# Patient Record
Sex: MALE | Race: WHITE | NOT HISPANIC OR LATINO
[De-identification: names, ages, dates, MRNs, and addresses within clinical notes are randomized per-mention and may not be internally consistent; named-entity substitution may affect disease eponyms.]

---

## 2021-07-20 ENCOUNTER — APPOINTMENT (OUTPATIENT)
Dept: INTERNAL MEDICINE | Facility: CLINIC | Age: 66
End: 2021-07-20

## 2021-12-17 ENCOUNTER — EMERGENCY (EMERGENCY)
Facility: HOSPITAL | Age: 66
LOS: 0 days | Discharge: HOME | End: 2021-12-17
Attending: STUDENT IN AN ORGANIZED HEALTH CARE EDUCATION/TRAINING PROGRAM
Payer: MEDICARE

## 2021-12-17 VITALS — HEIGHT: 72 IN | WEIGHT: 149.91 LBS

## 2021-12-17 VITALS
HEART RATE: 90 BPM | SYSTOLIC BLOOD PRESSURE: 196 MMHG | RESPIRATION RATE: 18 BRPM | TEMPERATURE: 99 F | OXYGEN SATURATION: 98 % | DIASTOLIC BLOOD PRESSURE: 96 MMHG

## 2021-12-17 DIAGNOSIS — S81.811A LACERATION WITHOUT FOREIGN BODY, RIGHT LOWER LEG, INITIAL ENCOUNTER: ICD-10-CM

## 2021-12-17 DIAGNOSIS — Z23 ENCOUNTER FOR IMMUNIZATION: ICD-10-CM
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
## 2021-12-17 DIAGNOSIS — S81.011A LACERATION WITHOUT FOREIGN BODY, RIGHT KNEE, INITIAL ENCOUNTER: ICD-10-CM
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
## 2021-12-17 PROCEDURE — 73562 X-RAY EXAM OF KNEE 3: CPT | Mod: RT

## 2021-12-17 PROCEDURE — 73700 CT LOWER EXTREMITY W/O DYE: CPT | Mod: MG,RT

## 2021-12-17 PROCEDURE — 12002 RPR S/N/AX/GEN/TRNK2.6-7.5CM: CPT

## 2021-12-17 PROCEDURE — 99284 EMERGENCY DEPT VISIT MOD MDM: CPT

## 2021-12-17 PROCEDURE — 99284 EMERGENCY DEPT VISIT MOD MDM: CPT | Mod: 25

## 2021-12-17 PROCEDURE — G1004: CPT

## 2021-12-17 PROCEDURE — 73700 CT LOWER EXTREMITY W/O DYE: CPT | Mod: 26,RT,MG

## 2021-12-17 PROCEDURE — 73562 X-RAY EXAM OF KNEE 3: CPT | Mod: 26,RT

## 2021-12-17 PROCEDURE — 76376 3D RENDER W/INTRP POSTPROCES: CPT

## 2021-12-17 PROCEDURE — 76376 3D RENDER W/INTRP POSTPROCES: CPT | Mod: 26

## 2021-12-17 PROCEDURE — 90471 IMMUNIZATION ADMIN: CPT

## 2021-12-17 PROCEDURE — 90715 TDAP VACCINE 7 YRS/> IM: CPT

## 2021-12-17 RX ORDER — CEFTRIAXONE 500 MG/1
1000 INJECTION, POWDER, FOR SOLUTION INTRAMUSCULAR; INTRAVENOUS ONCE
Refills: 0 | Status: DISCONTINUED | OUTPATIENT
Start: 2021-12-17 | End: 2021-12-17

## 2021-12-17 RX ORDER — CEPHALEXIN 500 MG
1 CAPSULE ORAL
Qty: 21 | Refills: 0
Start: 2021-12-17 | End: 2021-12-23

## 2021-12-17 RX ORDER — CEFAZOLIN SODIUM 1 G
1000 VIAL (EA) INJECTION ONCE
Refills: 0 | Status: COMPLETED | OUTPATIENT
Start: 2021-12-17 | End: 2021-12-17

## 2021-12-17 RX ORDER — METRONIDAZOLE 500 MG
1 TABLET ORAL
Qty: 21 | Refills: 0
Start: 2021-12-17 | End: 2021-12-23

## 2021-12-17 RX ORDER — METRONIDAZOLE 500 MG
500 TABLET ORAL ONCE
Refills: 0 | Status: COMPLETED | OUTPATIENT
Start: 2021-12-17 | End: 2021-12-17

## 2021-12-17 RX ORDER — CEFTRIAXONE 500 MG/1
500 INJECTION, POWDER, FOR SOLUTION INTRAMUSCULAR; INTRAVENOUS ONCE
Refills: 0 | Status: DISCONTINUED | OUTPATIENT
Start: 2021-12-17 | End: 2021-12-17

## 2021-12-17 RX ORDER — TETANUS TOXOID, REDUCED DIPHTHERIA TOXOID AND ACELLULAR PERTUSSIS VACCINE, ADSORBED 5; 2.5; 8; 8; 2.5 [IU]/.5ML; [IU]/.5ML; UG/.5ML; UG/.5ML; UG/.5ML
0.5 SUSPENSION INTRAMUSCULAR ONCE
Refills: 0 | Status: COMPLETED | OUTPATIENT
Start: 2021-12-17 | End: 2021-12-17

## 2021-12-17 RX ORDER — CEFTRIAXONE 500 MG/1
1000 INJECTION, POWDER, FOR SOLUTION INTRAMUSCULAR; INTRAVENOUS ONCE
Refills: 0 | Status: COMPLETED | OUTPATIENT
Start: 2021-12-17 | End: 2021-12-17

## 2021-12-17 RX ORDER — CEFAZOLIN SODIUM 1 G
2000 VIAL (EA) INJECTION EVERY 8 HOURS
Refills: 0 | Status: DISCONTINUED | OUTPATIENT
Start: 2021-12-17 | End: 2021-12-17

## 2021-12-17 RX ORDER — CEFAZOLIN SODIUM 1 G
1000 VIAL (EA) INJECTION ONCE
Refills: 0 | Status: DISCONTINUED | OUTPATIENT
Start: 2021-12-17 | End: 2021-12-17

## 2021-12-17 RX ADMIN — TETANUS TOXOID, REDUCED DIPHTHERIA TOXOID AND ACELLULAR PERTUSSIS VACCINE, ADSORBED 0.5 MILLILITER(S): 5; 2.5; 8; 8; 2.5 SUSPENSION INTRAMUSCULAR at 15:33

## 2021-12-17 RX ADMIN — Medication 500 MILLIGRAM(S): at 20:46

## 2021-12-17 NOTE — CONSULT NOTE ADULT - SUBJECTIVE AND OBJECTIVE BOX
66M presents to the  ED w/ a  laceration over the left knee. Patient states he was leaning over trimming ornamental with a chainsaw when he accidentally cut his Right knee with the blade. Bleeding controlled prior to consultation and patient states that he is experiencing minimal pain at or surrounding the site of the laceration. Denies radiation of pain elsewhere. States ability to ambulate immediately following the injury. Denies any weakness, numbness, or tingling int he Right lower extremity. Denies having any other pain elsewhere. Endorses a previous orthopaedic history of a discectomy at [facility] over 30 years ago; denies any additional previous orthopaedic history aside from being diagnosed with Osgood-Schlatter's disease at age 11. No other orthopaedic concerns at this time.    PAST MEDICAL & SURGICAL HISTORY:      No Known Allergies      PHYSICAL EXAM:  T(C): 37.4 (12-17-21 @ 14:34), Max: 37.4 (12-17-21 @ 14:34)  HR: 90 (12-17-21 @ 14:34) (90 - 90)  BP: 196/96 (12-17-21 @ 14:34) (196/96 - 196/96)  RR: 18 (12-17-21 @ 14:34) (18 - 18)  SpO2: 98% (12-17-21 @ 14:34) (98% - 98%)    Gen: NAD, Resting comfortably    RLE:  5 cm horizontally-oriented laceration overlying the supero-lateral patella; No active bleeding and wound appears grossly non-contaminated with no purulence or erythema of the surrounding skin.   No bony tenderness to palpation over the patella.  +Q/H/TA/EHL/FHL/GSC.   +SILT L3-S1.   + DP/PT.   Compartments soft and compressible.   No calf tenderness.     Secondary Survey:   LLE/RUE/LUE: No TTP over bony prominences, SILT, palpable pulses, full/painless range of motion, compartments soft.   Spine: No bony tenderness. No palpable step-offs.      Imaging:  XR R Knee: No acute fracture identified; Chronic osseous changes overlying the anterior proximal tibia, likely secondary to Osgood-Schlatter's.      A/P:  66M w/ right knee laceration.   - Intravenous antibiotics - Cefazolin 2000 mg q8h.   - Pain control as needed.   - WBAT RLE.   - Follow up CT RLE to rule out traumatic arthrotomy.   - Plan discussed with Dr. Pimentel; Will advise of any changes to the above plan.        66M presents to the  ED w/ a  laceration over the left knee. Patient states he was leaning over trimming ornamental with a chainsaw when he accidentally cut his Right knee with the blade. Bleeding controlled prior to consultation and patient states that he is experiencing minimal pain at or surrounding the site of the laceration. Denies radiation of pain elsewhere. States ability to ambulate immediately following the injury. Denies any weakness, numbness, or tingling int he Right lower extremity. Denies having any other pain elsewhere. Endorses a previous orthopaedic history of a discectomy at [facility] over 30 years ago; denies any additional previous orthopaedic history aside from being diagnosed with Osgood-Schlatter's disease at age 11. No other orthopaedic concerns at this time.    PAST MEDICAL & SURGICAL HISTORY:      No Known Allergies      PHYSICAL EXAM:  T(C): 37.4 (12-17-21 @ 14:34), Max: 37.4 (12-17-21 @ 14:34)  HR: 90 (12-17-21 @ 14:34) (90 - 90)  BP: 196/96 (12-17-21 @ 14:34) (196/96 - 196/96)  RR: 18 (12-17-21 @ 14:34) (18 - 18)  SpO2: 98% (12-17-21 @ 14:34) (98% - 98%)    Gen: NAD, Resting comfortably    RLE:  5 cm horizontally-oriented laceration overlying the supero-lateral patella; No active bleeding and wound appears grossly non-contaminated with no purulence or erythema of the surrounding skin.   No bony tenderness to palpation over the patella.  +Q/H/TA/EHL/FHL/GSC.   +SILT L3-S1.   + DP/PT.   Compartments soft and compressible.   No calf tenderness.     Secondary Survey:   LLE/RUE/LUE: No TTP over bony prominences, SILT, palpable pulses, full/painless range of motion, compartments soft.   Spine: No bony tenderness. No palpable step-offs.      Imaging:    XR R Knee: No acute fracture identified; Chronic osseous changes overlying the anterior proximal tibia, likely secondary to Osgood-Schlatter's.      CT R Femur: Soft tissue laceration anteriorly with subcutaneous air and tiny foci of   increased density anterior to the lateral patella, favored to represent small avulsed fracture fragments vs. foreign bodies and/or soft tissue mineralization. No air in the joint suggestive of traumatic arthrotomy     A/P:  66M w/ right knee laceration.   - Intravenous antibiotics.   - Pain control as needed.   - WBAT RLE.   - CT RLE without evidence of traumatic arthrotomy.   - No acute orthopaedic intervention indicated at this time.   - Plan discussed with Dr. Pimentel; Will advise of any changes to the above plan.        66M presents to the  ED w/ a  laceration over the left knee. Patient states he was leaning over trimming ornamental with a chainsaw when he accidentally cut his Right knee with the blade. Bleeding controlled prior to consultation and patient states that he is experiencing minimal pain at or surrounding the site of the laceration. Denies radiation of pain elsewhere. States ability to ambulate immediately following the injury. Denies any weakness, numbness, or tingling int he Right lower extremity. Denies having any other pain elsewhere. Endorses a previous orthopaedic history of a discectomy at [facility] over 30 years ago; denies any additional previous orthopaedic history aside from being diagnosed with Osgood-Schlatter's disease at age 11. No other orthopaedic concerns at this time.    PAST MEDICAL & SURGICAL HISTORY:      No Known Allergies      PHYSICAL EXAM:  T(C): 37.4 (12-17-21 @ 14:34), Max: 37.4 (12-17-21 @ 14:34)  HR: 90 (12-17-21 @ 14:34) (90 - 90)  BP: 196/96 (12-17-21 @ 14:34) (196/96 - 196/96)  RR: 18 (12-17-21 @ 14:34) (18 - 18)  SpO2: 98% (12-17-21 @ 14:34) (98% - 98%)    Gen: NAD, Resting comfortably    RLE:  5 cm horizontally-oriented laceration overlying the supero-lateral patella; No active bleeding and wound appears grossly non-contaminated with no purulence or erythema of the surrounding skin.   No bony tenderness to palpation over the patella.  +Q/H/TA/EHL/FHL/GSC.   +SILT L3-S1.   + DP/PT.   Compartments soft and compressible.   No calf tenderness.     Secondary Survey:   LLE/RUE/LUE: No TTP over bony prominences, SILT, palpable pulses, full/painless range of motion, compartments soft.   Spine: No bony tenderness. No palpable step-offs.      Imaging:    XR R Knee: No acute fracture identified; Chronic osseous changes overlying the anterior proximal tibia, likely secondary to Osgood-Schlatter's.      CT R Femur: Soft tissue laceration anteriorly with subcutaneous air and tiny foci of   increased density anterior to the lateral patella, favored to represent small avulsed fracture fragments vs. foreign bodies and/or soft tissue mineralization. No air in the joint suggestive of traumatic arthrotomy     A/P:  66M w/ right knee laceration.   - Patient refusing intravenous antibiotics; Recommend PO Abx x10 days.    - Pain control as needed.   - WBAT RLE.   - CT RLE without evidence of traumatic arthrotomy.   - No acute orthopaedic intervention indicated at this time.   - Follow up with Dr. Pimentel as an outpatient. Call office for appointment.   - Plan discussed with Dr. Pimentel; Will advise of any changes to the above plan.        66M presents to the  ED w/ a  laceration over the left knee. Patient states he was leaning over trimming ornamental with a chainsaw when he accidentally cut his Right knee with the blade. Bleeding controlled prior to consultation and patient states that he is experiencing minimal pain at or surrounding the site of the laceration. Denies radiation of pain elsewhere. States ability to ambulate immediately following the injury. Denies any weakness, numbness, or tingling int he Right lower extremity. Denies having any other pain elsewhere. Endorses a previous orthopaedic history of a discectomy at [facility] over 30 years ago; denies any additional previous orthopaedic history aside from being diagnosed with Osgood-Schlatter's disease at age 11. No other orthopaedic concerns at this time.    PAST MEDICAL & SURGICAL HISTORY:      No Known Allergies      PHYSICAL EXAM:  T(C): 37.4 (12-17-21 @ 14:34), Max: 37.4 (12-17-21 @ 14:34)  HR: 90 (12-17-21 @ 14:34) (90 - 90)  BP: 196/96 (12-17-21 @ 14:34) (196/96 - 196/96)  RR: 18 (12-17-21 @ 14:34) (18 - 18)  SpO2: 98% (12-17-21 @ 14:34) (98% - 98%)    Gen: NAD, Resting comfortably    RLE:  5 cm horizontally-oriented laceration overlying the supero-lateral patella; No active bleeding and wound appears grossly non-contaminated with no purulence or erythema of the surrounding skin.   No bony tenderness to palpation over the patella.  +Q/H/TA/EHL/FHL/GSC.   +SILT L3-S1.   + DP/PT.   Compartments soft and compressible.   No calf tenderness.     Secondary Survey:   LLE/RUE/LUE: No TTP over bony prominences, SILT, palpable pulses, full/painless range of motion, compartments soft.   Spine: No bony tenderness. No palpable step-offs.      Imaging:    XR R Knee: No acute fracture identified; Chronic osseous changes overlying the anterior proximal tibia, likely secondary to Osgood-Schlatter's.      CT R Femur: Soft tissue laceration anteriorly with subcutaneous air and tiny foci of   increased density anterior to the lateral patella, favored to represent small avulsed fracture fragments vs. foreign bodies and/or soft tissue mineralization. No air in the joint suggestive of traumatic arthrotomy     A/P:  66M w/ right knee laceration.   - Laceration probed, copiously irrigated, and closed at bedside under sterile conditions; Dressed with Xeroform, 4x4, and ACE bandage.   - Knee immobilizer to facilitate maintained wound approximation/healing.   - Patient refusing intravenous antibiotics; Recommend PO Abx x10 days.    - Pain control as needed.   - WBAT RLE.   - CT RLE without evidence of traumatic arthrotomy; small avulsed patellar fracture fragments vs. foreign bodies and/or soft tissue mineralization  - No acute orthopaedic intervention indicated at this time.   - Follow up with Dr. Pimentel as an outpatient. Call office for appointment.   - Plan discussed with Dr. Pimentel; Will advise of any changes to the above plan.

## 2021-12-17 NOTE — ED STATDOCS - NS ED ROS FT
Constitutional: No fever.  Eyes: No vision changes.   Ears, Nose, Mouth, Throat: No congestion.  Cardiovascular: No chest pain.  Respiratory: No difficulty breathing.  Gastrointestinal: No nausea . No vomiting.  Genitourinary: No dysuria.  Musculoskeletal: No joint pain.  Neurological: No headache.  Integumentary (skin and/or breast): No rash.  (+) laceration

## 2021-12-17 NOTE — ED STATDOCS - PROGRESS NOTE DETAILS
pt refusing IV in ED will give Rocephin IM and Doxycycline. pt seen by ortho team and cleared by them even though ct shows open laceration to right knee. Ortho Resident Mega states this is not an ortho concern and pt does not have to follow up with their service. -Amy Medrano PA-C pt refusing IV in ED will give Rocephin IM and Doxycycline. pt seen by ortho team and cleared by them even though ct shows open fx laceration to right knee. -Amy Medrano PA-C pt wound exploration shows tendon laceration, ortho team at bedside and states they will repair laceration, recommended iv antibiotics but pt still reluctant to have an IV placed, will dc home to fu with ortho outpt setting and take oral antibiotics. pt aware to return to ED for any worsening of symptoms and increase pain, reddens, pain or any other worsening of symptoms. pt agrees with plan. -Amy Medrano PA-C

## 2021-12-17 NOTE — ED STATDOCS - OBJECTIVE STATEMENT
67 y/o M with no significant PMHx presents to the ED c/o laceration to his R LE in the setting of trimming hedges with a chainsaw today. No other injuries. Tetanus status unknown. 67 y/o M with no significant PMHx presents to the ED c/o laceration to his R LE in the setting of trimming hedges with a chainsaw today. No other injuries. Tetanus status unknown. No pain meds taken prior to arrival. No associated lower extremity numbness.

## 2021-12-17 NOTE — ED STATDOCS - NSFOLLOWUPINSTRUCTIONS_ED_ALL_ED_FT
Laceration    A laceration is a cut that goes through all of the layers of the skin and into the tissue that is right under the skin. Some lacerations heal on their own. Others need to be closed with skin adhesive strips, skin glue, stitches (sutures), or staples. Proper laceration care minimizes the risk of infection and helps the laceration to heal better.  If non-absorbable stitches or staples have been placed, they must be taken out within the time frame instructed by your healthcare provider.    SEEK IMMEDIATE MEDICAL CARE IF YOU HAVE ANY OF THE FOLLOWING SYMPTOMS: swelling around the wound, worsening pain, drainage from the wound, red streaking going away from your wound, inability to move finger or toe near the laceration, or discoloration of skin near the laceration.    Fracture    A fracture is a break in one of your bones. This can occur from a variety of injuries, especially traumatic ones. Symptoms include pain, bruising, or swelling. Do not use the injured limb. If a fracture is in one of the bones below your waist, do not put weight on that limb unless instructed to do so by your healthcare provider. Crutches or a cane may have been provided. A splint or cast may have been applied by your health care provider. Make sure to keep it dry and follow up with an orthopedist as instructed.    SEEK IMMEDIATE MEDICAL CARE IF YOU HAVE ANY OF THE FOLLOWING SYMPTOMS: numbness, tingling, increasing pain, or weakness in any part of the injured limb.

## 2021-12-17 NOTE — ED STATDOCS - PATIENT PORTAL LINK FT
You can access the FollowMyHealth Patient Portal offered by [facility] by registering at the following website: [email or web address]. By joining [employer]’s FollowMyHealth portal, you will also be able to view your health information using other applications (apps) compatible with our system.

## 2021-12-17 NOTE — ED STATDOCS - PHYSICAL EXAMINATION
Vital signs as available reviewed.  General:  Comfortable, no acute distress.  Head:  Normocephalic, atraumatic.  Eyes:  Conjunctiva pink, no icterus.  Cardiovascular:  Regular rate, no obvious murmur.  Respiratory:  Clear to auscultation, good air entry bilaterally.  Abdomen:  Soft, non-tender.  Musculoskeletal:  No deformity or calf tenderness.  Neurologic: Alert and oriented, moving all extremities.  Skin:  Warm and dry.  (+) 5 cm laceration just above R patella, distally extremity neurovascularly intact, intact extensor mechanism Vital signs as available reviewed.  General:  Comfortable, no acute distress.  Head:  Normocephalic, atraumatic.  Eyes:  Conjunctiva pink, no icterus.  Cardiovascular:  Regular rate, no obvious murmur.  Respiratory:  Clear to auscultation, good air entry bilaterally.  Abdomen:  Soft, non-tender.  Musculoskeletal:  No deformity or calf tenderness.  Neurologic: Alert and oriented, moving all extremities.  Skin:  Warm and dry.  (+) 5 cm laceration just above R patella, distal extremity neurovascularly intact, intact extensor mechanism.

## 2021-12-17 NOTE — ED STATDOCS - ATTENDING CONTRIBUTION TO CARE
I, Sabrina Verma DO, personally saw the patient with ACP.  I have personally performed a face to face diagnostic evaluation on this patient and formulated the patient plan. The case was discussed with, and handed off to ACP who followed the case through to the re-evaluation and disposition.

## 2021-12-17 NOTE — ED STATDOCS - CARE PROVIDER_API CALL
Ciaran Pimentel (DO)  [address], [address]  [place] [address]  Phone: [phone]  Fax: [phone]  Follow Up Time: Urgent  
N/A

## 2021-12-28 ENCOUNTER — EMERGENCY (EMERGENCY)
Facility: HOSPITAL | Age: 66
LOS: 0 days | Discharge: HOME | End: 2021-12-28
Attending: EMERGENCY MEDICINE
Payer: MEDICARE

## 2021-12-28 VITALS
RESPIRATION RATE: 17 BRPM | DIASTOLIC BLOOD PRESSURE: 74 MMHG | TEMPERATURE: 98 F | SYSTOLIC BLOOD PRESSURE: 138 MMHG | HEART RATE: 89 BPM | OXYGEN SATURATION: 98 %

## 2021-12-28 VITALS — HEIGHT: 72 IN | WEIGHT: 149.91 LBS

## 2021-12-28 DIAGNOSIS — S81.011D LACERATION WITHOUT FOREIGN BODY, RIGHT KNEE, SUBSEQUENT ENCOUNTER: ICD-10-CM
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
## 2021-12-28 PROBLEM — Z78.9 OTHER SPECIFIED HEALTH STATUS: Chronic | Status: ACTIVE | Noted: 2021-12-23

## 2021-12-28 PROCEDURE — L9995: CPT

## 2021-12-28 PROCEDURE — G0463: CPT

## 2021-12-28 NOTE — ED STATDOCS - SKIN, MLM
skin normal color for race, warm, dry and intact. Right knee wound healing well. Nylon sutures in placed. No wound dehiscence or discharge.

## 2021-12-28 NOTE — ED STATDOCS - PROGRESS NOTE DETAILS
65 y/o Male s/p laceration to R knee 11 days ago.  In ED for suture removal.  Neg fevers, chills, dc from wound over R knee.  Has knee immobilizer to wear.  And will f/u on Ortho appt.  11 sutures removed s incident.  Kallie Gonzalez PA-C

## 2021-12-28 NOTE — ED STATDOCS - OBJECTIVE STATEMENT
67 y/o male with no significant PMHx presents to the ED for suture removal. Pt had sutures placed on 12/17 after staining a laceration to his right knee while trimming hedges. Pt presents today for suture removal. No other complaints at this time.

## 2021-12-28 NOTE — ED STATDOCS - PATIENT PORTAL LINK FT
You can access the FollowMyHealth Patient Portal offered by [facility] by registering at the following website: [email or web address]. By joining [employer]’s FollowMyHealth portal, you will also be able to view your health information using other applications (apps) compatible with our system.

## 2021-12-28 NOTE — ED ADULT TRIAGE NOTE - CHIEF COMPLAINT QUOTE
Patient comes to ED for suture removal to right knee. stitches placed last friday. Patient denies any fever or chills

## 2022-01-03 ENCOUNTER — APPOINTMENT (OUTPATIENT)
Dept: ORTHOPEDIC SURGERY | Facility: CLINIC | Age: 67
End: 2022-01-03

## 2022-11-17 ENCOUNTER — APPOINTMENT (OUTPATIENT)
Dept: INTERNAL MEDICINE | Facility: CLINIC | Age: 67
End: 2022-11-17

## 2022-11-17 ENCOUNTER — NON-APPOINTMENT (OUTPATIENT)
Age: 67
End: 2022-11-17

## 2022-11-17 VITALS — SYSTOLIC BLOOD PRESSURE: 140 MMHG | HEART RATE: 90 BPM | DIASTOLIC BLOOD PRESSURE: 82 MMHG | RESPIRATION RATE: 16 BRPM

## 2022-11-17 VITALS — BODY MASS INDEX: 18.76 KG/M2 | HEIGHT: 71.5 IN | WEIGHT: 137 LBS

## 2022-11-17 DIAGNOSIS — R44.8 OTHER SYMPTOMS AND SIGNS INVOLVING GENERAL SENSATIONS AND PERCEPTIONS: ICD-10-CM

## 2022-11-17 DIAGNOSIS — Z12.5 ENCOUNTER FOR SCREENING FOR MALIGNANT NEOPLASM OF PROSTATE: ICD-10-CM

## 2022-11-17 DIAGNOSIS — Z00.00 ENCOUNTER FOR GENERAL ADULT MEDICAL EXAMINATION W/OUT ABNORMAL FINDINGS: ICD-10-CM

## 2022-11-17 DIAGNOSIS — Z13.220 ENCOUNTER FOR SCREENING FOR LIPOID DISORDERS: ICD-10-CM

## 2022-11-17 DIAGNOSIS — R63.4 ABNORMAL WEIGHT LOSS: ICD-10-CM

## 2022-11-17 PROCEDURE — G0402 INITIAL PREVENTIVE EXAM: CPT

## 2022-11-17 PROCEDURE — G0438: CPT

## 2022-11-17 PROCEDURE — 36415 COLL VENOUS BLD VENIPUNCTURE: CPT | Mod: 59

## 2022-11-17 NOTE — ASSESSMENT
[FreeTextEntry1] : His exam is remarkable only for his weight at 137 pounds.\par \par Weight loss/feeling cold–lab work including thyroid functions was sent initially.  He just seems to have a decreased appetite.  He has no difficulty when he eats and there are no related symptoms.  Does seem to have a number of food intolerances which might limit his intake.\par Has not had a colonoscopy and this was suggested to 1.  He states he will consider.\par \par Possible anxiety–he has used Valium on a regular basis in the past.  We have discussed other medication options.  He describes self as more hyper as opposed to anxious.\par \par Fasting labs including lipid profile was sent to screen for lipid disorders.\par \par A PSA was sent for prostate cancer screening.

## 2022-11-17 NOTE — PHYSICAL EXAM
[No Acute Distress] : no acute distress [No JVD] : no jugular venous distention [No Lymphadenopathy] : no lymphadenopathy [Thyroid Normal, No Nodules] : the thyroid was normal and there were no nodules present [No Respiratory Distress] : no respiratory distress  [Clear to Auscultation] : lungs were clear to auscultation bilaterally [Normal] : normal rate, regular rhythm, normal S1 and S2 and no murmur heard [No Carotid Bruits] : no carotid bruits [No Edema] : there was no peripheral edema [Soft] : abdomen soft [Non Tender] : non-tender [No CVA Tenderness] : no CVA  tenderness [No Joint Swelling] : no joint swelling [Grossly Normal Strength/Tone] : grossly normal strength/tone [No Focal Deficits] : no focal deficits [Alert and Oriented x3] : oriented to person, place, and time

## 2022-11-17 NOTE — HISTORY OF PRESENT ILLNESS
[de-identified] : 68 y/o male is in the office to establish care and for his initial annual Medicare wellness exam. \par He has not seen a doctor or had any form of checkup in years.\par He has no significant past medical history other than a lifelong feeling of always being cold.  He also states that he has slowly lost weight over the past few years.  He finds it does not have a strong appetite but he does eat he has no problems.  He describes himself as being very hyper but not necessarily anxious.\par Has never had a colonoscopy.\par \par He is  with 2 grown children ages 32 and 34.  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

## 2022-11-17 NOTE — REVIEW OF SYSTEMS
[Recent Change In Weight] : ~T recent weight change [Anxiety] : anxiety [Fever] : no fever [Chills] : no chills [Chest Pain] : no chest pain [Palpitations] : no palpitations [Lower Ext Edema] : no lower extremity edema [Shortness Of Breath] : no shortness of breath [Dyspnea on Exertion] : not dyspnea on exertion [Abdominal Pain] : no abdominal pain [Nausea] : no nausea [Diarrhea] : no diarrhea [Vomiting] : no vomiting [Dysuria] : no dysuria [Muscle Weakness] : no muscle weakness [Headache] : no headache [Dizziness] : no dizziness

## 2022-11-17 NOTE — HEALTH RISK ASSESSMENT
[Never] : Never [No] : In the past 12 months have you used drugs other than those required for medical reasons? No [No falls in past year] : Patient reported no falls in the past year [0] : 2) Feeling down, depressed, or hopeless: Not at all (0) [[id number]] : 0

## 2022-11-18 ENCOUNTER — TRANSCRIPTION ENCOUNTER (OUTPATIENT)
Age: 67
End: 2022-11-18

## 2022-11-18 LAB
ALBUMIN SERPL ELPH-MCNC: 4.5 G/DL
ALP BLD-CCNC: 69 U/L
ALT SERPL-CCNC: 43 U/L
ANION GAP SERPL CALC-SCNC: 14 MMOL/L
AST SERPL-CCNC: 36 U/L
BASOPHILS # BLD AUTO: 0.02 K/UL
BASOPHILS NFR BLD AUTO: 0.3 %
BILIRUB SERPL-MCNC: 0.5 MG/DL
BUN SERPL-MCNC: 16 MG/DL
CALCIUM SERPL-MCNC: 9.3 MG/DL
CHLORIDE SERPL-SCNC: 101 MMOL/L
CHOLEST SERPL-MCNC: 132 MG/DL
CO2 SERPL-SCNC: 25 MMOL/L
CREAT SERPL-MCNC: 1.1 MG/DL
EGFR: 73 ML/MIN/1.73M2
EOSINOPHIL # BLD AUTO: 0.01 K/UL
EOSINOPHIL NFR BLD AUTO: 0.2 %
GLUCOSE SERPL-MCNC: 118 MG/DL
HCT VFR BLD CALC: 44.4 %
HDLC SERPL-MCNC: 53 MG/DL
HGB BLD-MCNC: 14.9 G/DL
IMM GRANULOCYTES NFR BLD AUTO: 0.3 %
LDLC SERPL CALC-MCNC: 63 MG/DL
LYMPHOCYTES # BLD AUTO: 0.98 K/UL
LYMPHOCYTES NFR BLD AUTO: 15.3 %
MAN DIFF?: NORMAL
MCHC RBC-ENTMCNC: 30.8 PG
MCHC RBC-ENTMCNC: 33.6 GM/DL
MCV RBC AUTO: 91.7 FL
MONOCYTES # BLD AUTO: 0.5 K/UL
MONOCYTES NFR BLD AUTO: 7.8 %
NEUTROPHILS # BLD AUTO: 4.87 K/UL
NEUTROPHILS NFR BLD AUTO: 76.1 %
NONHDLC SERPL-MCNC: 79 MG/DL
PLATELET # BLD AUTO: 198 K/UL
POTASSIUM SERPL-SCNC: 4.5 MMOL/L
PROT SERPL-MCNC: 7.3 G/DL
PSA SERPL-MCNC: 0.54 NG/ML
RBC # BLD: 4.84 M/UL
RBC # FLD: 12 %
SODIUM SERPL-SCNC: 140 MMOL/L
T4 SERPL-MCNC: 8.5 UG/DL
TRIGL SERPL-MCNC: 82 MG/DL
TSH SERPL-ACNC: 1.49 UIU/ML
WBC # FLD AUTO: 6.4 K/UL

## 2025-02-13 ENCOUNTER — APPOINTMENT (OUTPATIENT)
Dept: INTERNAL MEDICINE | Facility: CLINIC | Age: 70
End: 2025-02-13
Payer: MEDICARE

## 2025-02-13 VITALS — HEART RATE: 90 BPM | DIASTOLIC BLOOD PRESSURE: 100 MMHG | SYSTOLIC BLOOD PRESSURE: 160 MMHG

## 2025-02-13 VITALS
DIASTOLIC BLOOD PRESSURE: 118 MMHG | SYSTOLIC BLOOD PRESSURE: 168 MMHG | WEIGHT: 147 LBS | BODY MASS INDEX: 20.13 KG/M2 | HEIGHT: 71.5 IN

## 2025-02-13 DIAGNOSIS — Z78.9 OTHER SPECIFIED HEALTH STATUS: ICD-10-CM

## 2025-02-13 DIAGNOSIS — R03.0 ELEVATED BLOOD-PRESSURE READING, W/OUT DIAGNOSIS OF HYPERTENSION: ICD-10-CM

## 2025-02-13 DIAGNOSIS — F41.9 ANXIETY DISORDER, UNSPECIFIED: ICD-10-CM

## 2025-02-13 DIAGNOSIS — Z00.00 ENCOUNTER FOR GENERAL ADULT MEDICAL EXAMINATION W/OUT ABNORMAL FINDINGS: ICD-10-CM

## 2025-02-13 DIAGNOSIS — R73.09 OTHER ABNORMAL GLUCOSE: ICD-10-CM

## 2025-02-13 DIAGNOSIS — Z12.5 ENCOUNTER FOR SCREENING FOR MALIGNANT NEOPLASM OF PROSTATE: ICD-10-CM

## 2025-02-13 PROCEDURE — 36415 COLL VENOUS BLD VENIPUNCTURE: CPT

## 2025-02-13 PROCEDURE — G0439: CPT

## 2025-02-14 LAB
ALBUMIN SERPL ELPH-MCNC: 4.8 G/DL
ALP BLD-CCNC: 65 U/L
ALT SERPL-CCNC: 30 U/L
ANION GAP SERPL CALC-SCNC: 14 MMOL/L
AST SERPL-CCNC: 29 U/L
BASOPHILS # BLD AUTO: 0.03 K/UL
BASOPHILS NFR BLD AUTO: 0.5 %
BILIRUB SERPL-MCNC: 0.6 MG/DL
BUN SERPL-MCNC: 21 MG/DL
CALCIUM SERPL-MCNC: 10.2 MG/DL
CHLORIDE SERPL-SCNC: 99 MMOL/L
CO2 SERPL-SCNC: 28 MMOL/L
CREAT SERPL-MCNC: 1.19 MG/DL
EGFR: 66 ML/MIN/1.73M2
EOSINOPHIL # BLD AUTO: 0.06 K/UL
EOSINOPHIL NFR BLD AUTO: 1 %
ESTIMATED AVERAGE GLUCOSE: 114 MG/DL
GLUCOSE SERPL-MCNC: 120 MG/DL
HBA1C MFR BLD HPLC: 5.6 %
HCT VFR BLD CALC: 44.8 %
HGB BLD-MCNC: 15.4 G/DL
IMM GRANULOCYTES NFR BLD AUTO: 0.2 %
LYMPHOCYTES # BLD AUTO: 1.8 K/UL
LYMPHOCYTES NFR BLD AUTO: 29.2 %
MAN DIFF?: NORMAL
MCHC RBC-ENTMCNC: 31.9 PG
MCHC RBC-ENTMCNC: 34.4 G/DL
MCV RBC AUTO: 92.8 FL
MONOCYTES # BLD AUTO: 0.45 K/UL
MONOCYTES NFR BLD AUTO: 7.3 %
NEUTROPHILS # BLD AUTO: 3.82 K/UL
NEUTROPHILS NFR BLD AUTO: 61.8 %
PLATELET # BLD AUTO: 318 K/UL
POTASSIUM SERPL-SCNC: 4.3 MMOL/L
PROT SERPL-MCNC: 7.6 G/DL
PSA SERPL-MCNC: 0.62 NG/ML
RBC # BLD: 4.83 M/UL
RBC # FLD: 12.3 %
SODIUM SERPL-SCNC: 141 MMOL/L
TSH SERPL-ACNC: 1.53 UIU/ML
WBC # FLD AUTO: 6.17 K/UL